# Patient Record
Sex: FEMALE | Race: OTHER | Employment: STUDENT | URBAN - METROPOLITAN AREA
[De-identification: names, ages, dates, MRNs, and addresses within clinical notes are randomized per-mention and may not be internally consistent; named-entity substitution may affect disease eponyms.]

---
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
## 2021-01-22 PROBLEM — M22.2X1 RIGHT PATELLOFEMORAL SYNDROME: Status: ACTIVE | Noted: 2018-11-08

## 2022-07-26 ENCOUNTER — HOSPITAL ENCOUNTER (OUTPATIENT)
Age: 22
Discharge: HOME | End: 2022-07-26
Payer: COMMERCIAL

## 2022-07-26 VITALS
SYSTOLIC BLOOD PRESSURE: 108 MMHG | OXYGEN SATURATION: 100 % | HEART RATE: 71 BPM | RESPIRATION RATE: 18 BRPM | TEMPERATURE: 98 F | WEIGHT: 150 LBS | DIASTOLIC BLOOD PRESSURE: 72 MMHG | BODY MASS INDEX: 25.61 KG/M2 | HEIGHT: 64 IN

## 2022-07-26 DIAGNOSIS — B35.4 TINEA CORPORIS: Primary | ICD-10-CM

## 2022-07-26 PROCEDURE — 99203 OFFICE O/P NEW LOW 30 MIN: CPT | Performed by: PHYSICIAN ASSISTANT

## 2022-07-26 RX ORDER — CLOTRIMAZOLE 1 %
1 CREAM (GRAM) TOPICAL 2 TIMES DAILY
Qty: 45 G | Refills: 0 | Status: SHIPPED | OUTPATIENT
Start: 2022-07-26 | End: 2022-08-25

## 2024-06-03 ENCOUNTER — HOSPITAL ENCOUNTER (OUTPATIENT)
Age: 24
Discharge: HOME | End: 2024-06-03
Payer: COMMERCIAL

## 2024-06-03 VITALS
OXYGEN SATURATION: 98 % | SYSTOLIC BLOOD PRESSURE: 123 MMHG | TEMPERATURE: 98 F | HEART RATE: 72 BPM | RESPIRATION RATE: 18 BRPM | DIASTOLIC BLOOD PRESSURE: 81 MMHG

## 2024-06-03 DIAGNOSIS — H65.92 LEFT NON-SUPPURATIVE OTITIS MEDIA: Primary | ICD-10-CM

## 2024-06-03 DIAGNOSIS — J06.9 VIRAL URI: ICD-10-CM

## 2024-06-03 PROCEDURE — 99213 OFFICE O/P EST LOW 20 MIN: CPT | Performed by: NURSE PRACTITIONER

## 2024-06-03 RX ORDER — AMOXICILLIN AND CLAVULANATE POTASSIUM 875; 125 MG/1; MG/1
1 TABLET, FILM COATED ORAL 2 TIMES DAILY
Qty: 14 TABLET | Refills: 0 | Status: SHIPPED | OUTPATIENT
Start: 2024-06-03 | End: 2024-06-10

## 2024-06-03 RX ORDER — CETIRIZINE HYDROCHLORIDE 10 MG/1
10 TABLET ORAL DAILY
COMMUNITY

## 2024-06-03 RX ORDER — PREDNISONE 20 MG/1
40 TABLET ORAL DAILY
Qty: 10 TABLET | Refills: 0 | Status: SHIPPED | OUTPATIENT
Start: 2024-06-03 | End: 2024-06-08

## 2024-06-03 NOTE — ED INITIAL ASSESSMENT (HPI)
Pt c/o cough x2 weeks, states it became productive, nasal congestion started Saturday night, and yesterday Pt developed facial pressure and left ear pain

## 2024-06-03 NOTE — ED PROVIDER NOTES
Patient Seen in: [facility]      History   No chief complaint on file.    Stated Complaint: sinus pressure /cough x 2 weeks    Subjective:   23-year-old female who had a productive cough for 2 weeks which stopped yesterday.  However this past Saturday she developed nasal congestion onto the next day.  Yesterday she also started having left ear pain.  No known fevers.  No vomiting.  States she has really not tried anything for her symptoms.            Objective:   Past Medical History:    Asthma (HCC)    Bloating    Concussion    Diarrhea, unspecified    Food intolerance    History of depression              Past Surgical History:   Procedure Laterality Date    Knee surgery Bilateral     torn meniscus repair x 2    [place] teeth removed                  Social History     Socioeconomic History    Marital status: Single   Tobacco Use    Smoking status: Never    Smokeless tobacco: Never   Vaping Use    Vaping status: Never Used   Substance and Sexual Activity    Alcohol use: Yes     Alcohol/week: 1.0 standard drink of alcohol     Types: 1 Standard drinks or equivalent per week    Drug use: No    Sexual activity: Not Currently              Review of Systems   Constitutional: Negative.    HENT:  Positive for congestion and ear pain.    Respiratory:  Negative for cough.    All other systems reviewed and are negative.      Positive for stated complaint: sinus pressure /cough x 2 weeks  Other systems are as noted in HPI.  Constitutional and vital signs reviewed.      All other systems reviewed and negative except as noted above.    Physical Exam     ED Triage Vitals [06/03/24 0929]   /81   Pulse 72   Resp 18   Temp 98.2 °F (36.8 °C)   Temp src Oral   SpO2 98 %   O2 Device None (Room air)       Current Vitals:   Vital Signs  BP: 123/81  Pulse: 72  Resp: 18  Temp: 98.2 °F (36.8 °C)  Temp src: Oral    Oxygen Therapy  SpO2: 98 %  O2 Device: None (Room air)            Physical Exam  Vitals and nursing  note reviewed.   Constitutional:       General: She is not in acute distress.     Appearance: Normal appearance. She is not ill-appearing, toxic-appearing or diaphoretic.   HENT:      Head:      Jaw: No trismus.      Right Ear: Tympanic membrane, ear canal and external ear normal.      Left Ear: Ear canal and external ear normal. No mastoid tenderness. Tympanic membrane is injected and bulging.      Nose: Congestion present.      Mouth/Throat:      Lips: Pink. No lesions.      Mouth: Mucous membranes are moist. No oral lesions or angioedema.      Pharynx: Oropharynx is clear. Uvula midline. No pharyngeal swelling, oropharyngeal exudate, posterior oropharyngeal erythema or uvula swelling.   Cardiovascular:      Rate and Rhythm: Normal rate and regular rhythm.      Pulses: Normal pulses.      Heart sounds: Normal heart sounds.   Pulmonary:      Effort: Pulmonary effort is normal. No respiratory distress.      Breath sounds: Normal breath sounds. No stridor. No wheezing, rhonchi or rales.   Musculoskeletal:      Cervical back: Normal range of motion and neck supple.   Skin:     General: Skin is warm and dry.      Coloration: Skin is not jaundiced or pale.      Findings: No rash.   Neurological:      General: No focal deficit present.      Mental Status: She is alert and oriented to person, place, and time.   Psychiatric:         Mood and Affect: Mood normal.         Behavior: Behavior normal.               ED Course   Labs Reviewed - No data to display                   MDM                                         Medical Decision Making  Differential diagnosis initially included but was not limited to: Otitis media, mastoiditis, viral URI    Nontoxic 23-year-old female, with left ear pain and nasal congestion.  Did have a cough a couple weeks ago which resolved.  No respiratory distress.  No adventitious breath sounds.  Unlikely to be pneumonia.  No mastoid tenderness, not likely to be mastoiditis.  Nasal congestion  with some discomfort in the sinuses.  Due to the symptoms just beginning a couple days ago and unlikely to be bacterial source.  However her left tympanic membrane does appear to be infected.  I will empirically treat her with Augmentin.  5-day course of prednisone.  OTC Flonase.  See the discharge instruction for more home management instructions.    Supportive/home management of diagnosis/illness/injury discussed. Red flag symptoms discussed.  Signs and symptoms/criteria that would necessitate reevaluation, including ER evaluation discussed.  Patient and/or responsible adult verbalize and agree with management and plan of care.    Speech recognition software was used during this dictation.  There may be minor errors in transcription.      Risk  OTC drugs.  Prescription drug management.        Disposition and Plan     Clinical Impression:  1. Left non-suppurative otitis media    2. Viral URI         Disposition:  Discharge  6/3/2024  9:43 am    Follow-up:  Gerri Fitzpatrick MD  [address] [address]  [phone]    Call in 2 days            Medications Prescribed:  Current Discharge Medication List        START taking these medications    Details   amoxicillin clavulanate 875-125 MG Oral Tab Take 1 tablet by mouth 2 (two) times daily for 7 days.  Qty: 14 tablet, Refills: 0      predniSONE 20 MG Oral Tab Take 2 tablets (40 mg total) by mouth daily for 5 days.  Qty: 10 tablet, Refills: 0

## 2024-06-03 NOTE — DISCHARGE INSTRUCTIONS
Take the medications as prescribed.  Over-the-counter Flonase for the nasal congestion.  I also recommend cool-mist humidifier in the same room.  Steam inhalation such as hot steam showers.  Sinus rinses such as the Nafisa pot.  Over-the-counter Tylenol/Motrin as needed for pain.  Call your doctor in 2 days to arrange a follow-up appointment.